# Patient Record
Sex: FEMALE | Race: BLACK OR AFRICAN AMERICAN | NOT HISPANIC OR LATINO | URBAN - METROPOLITAN AREA
[De-identification: names, ages, dates, MRNs, and addresses within clinical notes are randomized per-mention and may not be internally consistent; named-entity substitution may affect disease eponyms.]

---

## 2017-04-23 ENCOUNTER — EMERGENCY (EMERGENCY)
Facility: HOSPITAL | Age: 22
LOS: 1 days | Discharge: HOME | End: 2017-04-23
Attending: EMERGENCY MEDICINE | Admitting: EMERGENCY MEDICINE
Payer: COMMERCIAL

## 2017-04-23 VITALS
HEART RATE: 88 BPM | SYSTOLIC BLOOD PRESSURE: 108 MMHG | DIASTOLIC BLOOD PRESSURE: 75 MMHG | RESPIRATION RATE: 16 BRPM | TEMPERATURE: 97 F

## 2017-04-23 DIAGNOSIS — R41.82 ALTERED MENTAL STATUS, UNSPECIFIED: ICD-10-CM

## 2017-04-23 DIAGNOSIS — F10.129 ALCOHOL ABUSE WITH INTOXICATION, UNSPECIFIED: ICD-10-CM

## 2017-04-23 PROCEDURE — 99283 EMERGENCY DEPT VISIT LOW MDM: CPT

## 2017-04-23 PROCEDURE — 99285 EMERGENCY DEPT VISIT HI MDM: CPT

## 2017-04-23 NOTE — ED PROVIDER NOTE - PHYSICAL EXAMINATION
CONSTITUTIONAL: intoxicated appearing  HEENT: Head is atraumatic. Eyes clear bilaterally, normal EOMI. Airway patent.  CARDIAC: Normal rate, regular rhythm.  Heart sounds S1, S2.   RESPIRATORY: Breath sounds clear and equal bilaterally.  GASTROINTESTINAL: Abdomen soft, non-tender, no guarding.  MUSCULOSKELETAL: Spine appears normal, range of motion is not limited, no muscle or joint tenderness. no evidence of external trauma, no midline c/t/l spine tenderness.  NEUROLOGICAL: Alert and oriented, no focal deficits, no motor or sensory deficits.  SKIN: Skin normal color for race, warm, dry and intact. No evidence of rash.  PSYCHIATRIC: Alert and oriented to person, place, time/situation. normal mood and affect. no apparent risk to self or others. CONSTITUTIONAL: intoxicated appearing  HEENT: Head is atraumatic. Eyes clear bilaterally, normal EOMI. Airway patent.  CARDIAC: Normal rate, regular rhythm.  Heart sounds S1, S2.     RESPIRATORY: Breath sounds clear and equal bilaterally.  GASTROINTESTINAL: Abdomen soft, non-tender, no guarding.  MUSCULOSKELETAL: Spine appears normal, range of motion is not limited, no muscle or joint tenderness. no evidence of external trauma, no midline c/t/l spine tenderness.  NEUROLOGICAL: no focal deficits, no motor or sensory deficits.  SKIN: Skin normal color for race, warm, dry and intact. No evidence of rash.

## 2017-04-23 NOTE — ED ADULT TRIAGE NOTE - CHIEF COMPLAINT QUOTE
per EMS doorman called EMS, pt was standing in lobby, pt admits drinking today, no injury to head noted. FS in 129 in field

## 2017-04-23 NOTE — ED PROVIDER NOTE - OBJECTIVE STATEMENT
21 yo female prev healthy presenting for ams, pt admits to etoh use but does not want to specify what exactel 23 yo female prev healthy presenting for ams, pt admits to etoh use but does not want to specify what exactly she took, denies etoh use. Vomited prior to arrival, per EMS, pt found in lobby after which doorman called. No somatic complaints, trauma, abd pain, chest pain. hx limited by mental status, very intoxicated on exam. 21 yo female prev healthy presenting for ams, pt admits to etoh use but does not want to specify what exactly she took, denies other substance use. Vomited prior to arrival, per EMS, pt found in lobby after which doorman called. No somatic complaints, trauma, abd pain, chest pain. hx limited by mental status, very intoxicated on exam.

## 2017-04-23 NOTE — ED PROVIDER NOTE - MEDICAL DECISION MAKING DETAILS
Pt prev healthy to ED with ams, likely etoh intoxication- admits to using etoh and no other substances. No evidence of trauma, BG wnl, neuro exam wnl. Will allow to sober and re-assess. Pt prev healthy to ED with ams, likely etoh intoxication- admits to using etoh and no other substances. No evidence of trauma, BG wnl, neuro exam wnl. Will allow to sober and re-assess.  Patient alert and coherent. Ambulating well, nad and vss. Pt prev healthy to ED with ams, likely etoh intoxication- admits to using etoh and no other substances. No evidence of trauma, BG wnl, neuro exam wnl. Will allow to sober and re-assess. Signed out to MELY Hendrix and Dr Noble dispo pending sobriety.  Patient alert and coherent. Ambulating well, nad and vss.

## 2017-04-23 NOTE — ED ADULT NURSE NOTE - OBJECTIVE STATEMENT
Pt BIBA after complaints of pt with AMS vomiting and smells of ETOH. Pt responds to verbal stimuli. Very drowsy.

## 2017-04-24 VITALS
HEART RATE: 108 BPM | SYSTOLIC BLOOD PRESSURE: 116 MMHG | TEMPERATURE: 98 F | OXYGEN SATURATION: 98 % | RESPIRATION RATE: 16 BRPM | DIASTOLIC BLOOD PRESSURE: 72 MMHG

## 2019-10-12 NOTE — ED ADULT NURSE NOTE - NS ED NURSE RECORD ANOTHER VITAL SIGN
Yes Bactrim Pregnancy And Lactation Text: This medication is Pregnancy Category D and is known to cause fetal risk.  It is also excreted in breast milk.

## 2022-05-27 NOTE — ED ADULT NURSE NOTE - PT NEEDS ASSIST
[Binocular Microscopic Exam] : Binocular microscopic exam was performed [Normal] : the left external ear was normal [FreeTextEntry8] : some yellow fluid [FreeTextEntry9] : wet cerumen & yellow fluid cleared w/ suction [de-identified] : tube in place [de-identified] : tube in place [de-identified] : wet [de-identified] : wet no

## 2023-07-26 NOTE — ED ADULT NURSE NOTE - EXTENSIONS OF SELF_ADULT
LAST VISIT:   2/21/2023     Future Appointments   Date Time Provider [address]   8/25/2023  3:40 PM MD DAVID Lock None